# Patient Record
Sex: MALE | Race: WHITE | NOT HISPANIC OR LATINO | Employment: OTHER | URBAN - METROPOLITAN AREA
[De-identification: names, ages, dates, MRNs, and addresses within clinical notes are randomized per-mention and may not be internally consistent; named-entity substitution may affect disease eponyms.]

---

## 2021-01-07 ENCOUNTER — CONSULT (OUTPATIENT)
Dept: PAIN MEDICINE | Facility: CLINIC | Age: 66
End: 2021-01-07
Payer: COMMERCIAL

## 2021-01-07 ENCOUNTER — TELEPHONE (OUTPATIENT)
Dept: PAIN MEDICINE | Facility: MEDICAL CENTER | Age: 66
End: 2021-01-07

## 2021-01-07 VITALS
HEIGHT: 71 IN | BODY MASS INDEX: 26.88 KG/M2 | SYSTOLIC BLOOD PRESSURE: 170 MMHG | DIASTOLIC BLOOD PRESSURE: 98 MMHG | WEIGHT: 192 LBS | TEMPERATURE: 97.2 F | HEART RATE: 71 BPM

## 2021-01-07 DIAGNOSIS — M47.816 FACET HYPERTROPHY OF LUMBAR REGION: ICD-10-CM

## 2021-01-07 DIAGNOSIS — M54.16 LUMBAR RADICULOPATHY: Primary | ICD-10-CM

## 2021-01-07 DIAGNOSIS — M43.16 SPONDYLOLISTHESIS OF LUMBAR REGION: ICD-10-CM

## 2021-01-07 PROCEDURE — 99244 OFF/OP CNSLTJ NEW/EST MOD 40: CPT | Performed by: ANESTHESIOLOGY

## 2021-01-07 RX ORDER — GABAPENTIN 300 MG/1
300 CAPSULE ORAL
Qty: 30 CAPSULE | Refills: 1 | Status: SHIPPED | OUTPATIENT
Start: 2021-01-07

## 2021-01-07 RX ORDER — IBUPROFEN 400 MG/1
TABLET ORAL EVERY 6 HOURS PRN
COMMUNITY

## 2021-01-07 NOTE — PROGRESS NOTES
Assessment  1  Lumbar radiculopathy - Left    2  Spondylolisthesis of lumbar region    3  Facet hypertrophy of lumbar region        Plan      At this point the patient's pain persists despite time, relative rest, activity modification, and nonsteroidal anti-inflammatories  His pain is significantly interfering with his daily living activities  He does have neurological deficit  I believe is appropriate to order an MRI of the lumbar spine to rule out any significant etiology of his symptoms  I will start him on a titrating dose of gabapentin to address any neuropathic component of the patient's pain  I did review the potential side effects of gabapentin, not limited to, but including dizziness, drowsiness, weakness, tired feeling, nausea, diarrhea, constipation, blurred vision, headache, swelling, dry mouth; or loss of balance or coordination     If he has any problems or questions he will give us a call  Once we obtain MRI results we will proceed from there  My impressions and treatment recommendations were discussed in detail with the patient who verbalized understanding and had no further questions  Discharge instructions were provided  I personally saw and examined the patient and I agree with the above discussed plan of care  This note is created using dictation transcription  It may contain typographical errors, grammatical errors, improperly dictated words, background noise and other errors  Orders Placed This Encounter   Procedures    MRI lumbar spine without contrast     Standing Status:   Future     Standing Expiration Date:   1/7/2025     Scheduling Instructions: There is no preparation for this test  Please leave your jewelry and valuables at home, wedding rings are the exception  Magnetic nail polish must be removed prior to arrival for your test  Please bring your insurance cards, a form of photo ID and a list of your medications with you   Arrive 15 minutes prior to your appointment time in order to register  Please bring any prior CT or MRI studies of this area that were not performed at a [facility] facility  To schedule this appointment, please contact Central Scheduling at [phone]  Prior to your appointment, please make sure you complete the MRI Screening Form when you e-Check in for your appointment  This will be available starting 7 days before your appointment in [address]  You may receive an e-mail with an activation code if you do not have a [employer] account  If you do not have access to a device, we will complete your screening at your appointment  Order Specific Question:   What is the patient's sedation requirement? Answer:   No Sedation     Order Specific Question:   Release to patient through [employer]     Answer:   Immediate     Order Specific Question:   Is order priority selected as STAT? Answer:   No     Order Specific Question:   Reason for Exam (FREE TEXT)     Answer:   left lumbar     New Medications Ordered This Visit   Medications    Acetaminophen (TYLENOL 8 HOUR PO)     Sig: Take by mouth    ibuprofen (MOTRIN) 400 mg tablet     Sig: Take by mouth every 6 (six) hours as needed for mild pain    gabapentin (NEURONTIN) 300 mg capsule     Sig: Take 1 capsule (300 mg total) by mouth daily at bedtime     Dispense:  30 capsule     Refill:  1     Referred By: Lindsey Duffy DO  History of Present Illness    Galen Galeazzi is a 63 y o  male with approximately 4-6 weeks history of left lower extremity pain  He is unaware of any clear precipitating event denies any trauma or injury her this may have occurred after carrying heavy box up a flight of stairs  His pain is moderate to severe he has tried oral steroids x2 physician supervised home exercises nonsteroidal anti-inflammatories and opioids, the latter he discontinue consider did not like how it made him feel    His pain is completely interfering with daily living activities which is nearly constant  Describes the pain as shooting sharp achy and has weakness of his left lower limb  He reports that lying down, sitting, relaxation decreases symptoms while standing bending walking and exercise aggravate his pain  Heat nice have provided moderate but short-term relief  Denies bowel or bladder dysfunction  I have personally reviewed and/or updated the patient's past medical history, past surgical history, family history, social history, current medications, allergies, and vital signs today  Review of Systems   Constitutional: Negative for chills and fever  HENT: Negative for ear pain and sore throat  Eyes: Negative for pain and visual disturbance  Respiratory: Negative for cough and shortness of breath  Cardiovascular: Negative for chest pain and palpitations  Gastrointestinal: Negative for abdominal pain and vomiting  Genitourinary: Negative for dysuria and hematuria  Musculoskeletal: Positive for gait problem  Negative for arthralgias and back pain  Skin: Negative for color change and rash  Neurological: Positive for weakness (muscle weakness)  Negative for seizures and syncope  All other systems reviewed and are negative  There is no problem list on file for this patient  Past Medical History:   Diagnosis Date    Hypertension     borderline       History reviewed  No pertinent surgical history      Family History   Problem Relation Age of Onset    Heart disease Mother        Social History     Occupational History    Not on file   Tobacco Use    Smoking status: Never Smoker    Smokeless tobacco: Never Used   Substance and Sexual Activity    Alcohol use: Yes     Frequency: 2-4 times a month    Drug use: Not Currently    Sexual activity: Not Currently       Current Outpatient Medications on File Prior to Visit   Medication Sig    Acetaminophen (TYLENOL 8 HOUR PO) Take by mouth    ibuprofen (MOTRIN) 400 mg tablet Take by mouth every 6 (six) hours as needed for mild pain     No current facility-administered medications on file prior to visit  No Known Allergies    Physical Exam    /98 (BP Location: Left arm, Patient Position: Sitting, Cuff Size: Standard)   Pulse 71   Temp (!) 97 2 °F (36 2 °C)   Ht 5' 11" (1 803 m)   Wt 87 1 kg (192 lb)   BMI 26 78 kg/m²     Constitutional: normal, well developed, well nourished, alert, in no distress and non-toxic and no overt pain behavior  Eyes: anicteric  HEENT: grossly intact  Neck: supple, symmetric, trachea midline and no masses   Pulmonary:even and unlabored  Cardiovascular:No edema or pitting edema present  Skin:Normal without rashes or lesions and well hydrated  Psychiatric:Mood and affect appropriate  Neurologic:Cranial Nerves II-XII grossly intact  Musculoskeletal:normal and antalgic, difficulty going from sitting to standing sitting position no obvious skin lesions or erythema lumbar sacral spine no tenderness to palpation lumbar sacral spine spinous process sacroiliac joint or greater trochanter bilateral, deep tendon reflexes are symmetrical bilateral patellar, absent left Achilles 1+ right Achilles, decreased sensation left L5 distribution to pinwheel, 3-5 strength left extensor hallucis longus no other motor deficits are appreciated, positive straight leg raising left negative on the right    Imaging  Xray Lumbar @ [facility] 12-28-20  Impression:  Degenerative disc and facet disease worse at L4-L5 as above  I have personally reviewed pertinent films in PACS

## 2021-01-07 NOTE — TELEPHONE ENCOUNTER
Pt called stating he has to go to [facility] to have the MRI completed pt needs an authorization before he can schedule   Pt can be reached at [phone]

## 2021-01-07 NOTE — PATIENT INSTRUCTIONS
Lumbar Radiculopathy   WHAT YOU NEED TO KNOW:   What is lumbar radiculopathy? Lumbar radiculopathy is a painful condition that happens when a nerve in your lumbar spine (lower back) is pinched or irritated  Nerves control feeling and movement in your body  What causes lumbar radiculopathy? You may get a pinched nerve in your lumbar spine if you have disc damage  Discs are natural, spongy cushions between your vertebrae (back bones) that allow your spine to move  Your discs may move out of place and pinch the nerve in your spine  Your risk for a pinched nerve and lumbar radiculopathy increases if:  · You smoke  · You have diabetes, a spinal infection, or a growth in your spine  · You are overweight  · You are male  · You are elderly  What are the signs and symptoms of lumbar radiculopathy? You may have any of the following:  · Pain that moves from your lower back to your buttocks, groin, and the back of your leg  The pain is often felt below your knee  Your pain may worsen when you cough, sneeze, stand, or sit  · Numbness, weakness, or tingling in your back or legs  How is lumbar radiculopathy diagnosed? Your healthcare provider will examine you and ask about your family history of back and leg pain  He may also test you for weakness, numbness, or tingling in your back, buttocks, and legs  Your healthcare provider may ask you to lie on your back and lift your leg to locate your pain  You may have any of the following:  · Blood tests: You may need blood taken to give healthcare providers information about how your body is working  The blood may be taken from your hand, arm, or IV  · Magnetic resonance imaging (MRI): An MRI machine is used to take a picture of your lower back  Your healthcare provider will use this picture to check for problems and changes in your back bones, nerves, and discs  You will need to lie still during this test  The MRI machine contains a very powerful magnet  Never enter the MRI room with any metal objects  This can cause serious injury  Tell your healthcare provider if you have any metal implants in your body  · X-ray: An x-ray is a picture of your lower back  A lumbar x-ray may show signs of infection or other problems with your spine  · An electromyography (EMG)  test measures the electrical activity of your muscles at rest and with movement  · Computed tomography (CT) scan: A special x-ray machine uses a computer to take pictures of your lower back  It may be used to look at your bones, discs, and nerves  You may be given dye in your IV to help improve the pictures  Tell your healthcare provider if you are allergic to shellfish, or have other allergies or medical conditions  People who are allergic to iodine or shellfish (lobster, crab, or shrimp) may be allergic to some dyes  How is lumbar radiculopathy treated? Treatment of lumbar radiculopathy may reduce pain and swelling, and improve your ability to walk and do your normal activities  Ask your healthcare provider for more information about these and other treatments for lumbar radiculopathy:  · Medicines:     ? NSAIDs , such as ibuprofen, help decrease swelling, pain, and fever  This medicine is available with or without a doctor's order  NSAIDs can cause stomach bleeding or kidney problems in certain people  If you take blood thinner medicine, always ask your healthcare provider if NSAIDs are safe for you  Always read the medicine label and follow directions  ? Muscle relaxers  help decrease pain and muscle spasms  ? Opioids: This is a strong medicine given to reduce severe pain  It is also called narcotic pain medicine  Take this medicine exactly as directed by your healthcare provider  ? Oral steroids: Steroids may be given to reduce swelling and pain  ? Steroid injections: Healthcare providers may give you steroid medicine through a needle (shot) into your lumbar spine   This may help decrease your nerve pain and swelling  You may need more than 1 injection if your symptoms do not improve after the first treatment  · Physical therapy: Your healthcare provider may suggest physical therapy  Your physical therapist may teach you certain exercises to improve posture (the way you stand and sit), flexibility, and strength in your lower back  He may also teach you how to remain safely active and avoid further injury  Follow the exercise plan given to you by your physical therapist     · Transcutaneous electrical nerve stimulation: This treatment, called TENS, stimulates your nerves and may decrease your pain  Wires are attached to pads  The pads are attached to your skin  The wires send a mild current through your nerves  · Surgery: You may need surgery to relieve your pinched nerve if your condition has not improved within 4 to 6 weeks  You may also need it if you have lumbar radiculopathy more than once  What are the risks of treatment for lumbar radiculopathy? · Without treatment, your pain may worsen  The pinched and swollen nerve may lead to problems when you walk or move  In severe cases, you may lose control of your urine or bowel movements  Bedrest can make your symptoms worse  · Pain medicines can cause vomiting, upset stomach, constipation (large, hard bowel movements that are difficult to pass), or kidney or liver problems  Opioid medicine may be addictive (hard to quit taking once you start)  Oral steroids and steroid injections may have side effects, such as facial redness, fluid retention (water weight gain), and mood changes  Steroid injections may be painful and can cause severe headaches, infections, allergic reactions, or nerve damage  Surgery risks include delayed problems with healing, spinal or bladder infection, damage to the spinal cord or other nerves, and ongoing pain  In rare cases, you could become paralyzed (not able to move your arms or legs)      How can I care for myself when I have lumbar radiculopathy? · Stay active: It is best to be active when you have lumbar radiculopathy  Your healthcare provider may tell you to take walks to ease yourself back into your daily routine  Avoid long periods of bed rest  Bed rest could worsen your symptoms  Do not move in ways that increase your pain  Ask your healthcare provider for more information about the best ways to stay active  · Use ice or heat packs:  Use ice or heat packs on the sore area of your body to decrease the pain and swelling  Put ice in a plastic bag covered with a towel on your low back  Cover heated items with a towel to avoid burns  Use ice and heat as directed  · Avoid heavy lifting: Your condition may worsen if you lift heavy things  Avoid lifting if possible  · Maintain a healthy weight:  Excess body weight may strain your back  Talk with your healthcare provider about ways to lose excess weight if you are overweight  When should I contact my healthcare provider? · Your pain does not improve within 1 to 3 weeks after treatment  · Your pain and weakness keep you from your normal activities at work, home, or school  · You lose more than 10 pounds in 6 months without trying  · You become depressed or sad because of the pain  · You have questions or concerns about your condition or care  When should I seek immediate care or call 911? · You have a fever greater than 100 4°F for longer than 2 days  · You have new, severe back or leg pain, or your pain spreads to both legs  · You have any new signs of numbness or weakness, especially in your lower back, legs, arms, or genital area  · You have new trouble controlling your urine and bowel movements  · You do not feel like your bladder empties when you urinate  CARE AGREEMENT:   You have the right to help plan your care  Learn about your health condition and how it may be treated   Discuss treatment options with your healthcare providers to decide what care you want to receive  You always have the right to refuse treatment  The above information is an  only  It is not intended as medical advice for individual conditions or treatments  Talk to your doctor, nurse or pharmacist before following any medical regimen to see if it is safe and effective for you  © Copyright [address] Information is for End User's use only and may not be sold, redistributed or otherwise used for commercial purposes   All illustrations and images included in CareNotes® are the copyrighted property of A D A M [employer] Inc  or [address] [place] [address]

## 2021-01-11 NOTE — TELEPHONE ENCOUNTER
Order Request Summary   Order ID: [id number]          Request Status:   Authorized  Health Plan:  [facility]     Valid Dates:   01/11/2021 - 03/11/2021     Scheduled Date of Service:   01/11/2021

## 2021-01-12 ENCOUNTER — TELEPHONE (OUTPATIENT)
Dept: PAIN MEDICINE | Facility: CLINIC | Age: 66
End: 2021-01-12

## 2021-01-12 NOTE — TELEPHONE ENCOUNTER
Patient has MRI scheduled at [facility] 1/14 scheduled Virtual with NM on 1/20 at 9:45 am please send email for virtual      Thank you

## 2021-01-14 ENCOUNTER — TELEPHONE (OUTPATIENT)
Dept: PAIN MEDICINE | Facility: CLINIC | Age: 66
End: 2021-01-14

## 2021-01-14 NOTE — TELEPHONE ENCOUNTER
S/w pt, advised of above  Pt stated that he had pain in his low back, L side, L buttocks and down the outside of his L leg and into his L shin  Pt stated that since he started gabapentin, his pain has greatly improved  C/o weakness in his L leg akua in his groin and knee w/ a dull ache  Pt verbalized agreement w/ tfesi  Denied blood thinning medication  Advised pt, will make SL aware  Anticiapte a cb from [address] to schedule this injection  Cb sooner if questions  /concerns arise

## 2021-01-14 NOTE — TELEPHONE ENCOUNTER
Left L4 left L5 TFESI X2, diagnosis lumbar disc herniation, lumbar foraminal stenosis with lumbar radiculitis

## 2021-01-14 NOTE — TELEPHONE ENCOUNTER
----- Message from Cuco Sampson DO sent at 1/14/2021 11:23 AM EST -----  MRI lumbar spine reveals left disc protrusion and foraminal stenosis at L4-5 and L5-S1, how his pain? His pain mostly left-sided?   I would recommend a left L4 left L5 transforaminal epidural steroid injection x2

## 2021-01-22 ENCOUNTER — HOSPITAL ENCOUNTER (OUTPATIENT)
Dept: RADIOLOGY | Facility: CLINIC | Age: 66
Discharge: HOME | End: 2021-01-22
Attending: ANESTHESIOLOGY

## 2021-01-22 ENCOUNTER — TELEPHONE (OUTPATIENT)
Dept: PAIN MEDICINE | Facility: CLINIC | Age: 66
End: 2021-01-22

## 2021-01-22 VITALS
DIASTOLIC BLOOD PRESSURE: 93 MMHG | OXYGEN SATURATION: 99 % | RESPIRATION RATE: 18 BRPM | TEMPERATURE: 97.3 F | HEART RATE: 61 BPM | SYSTOLIC BLOOD PRESSURE: 175 MMHG

## 2021-01-22 DIAGNOSIS — M51.26 LUMBAR DISC HERNIATION: ICD-10-CM

## 2021-01-22 DIAGNOSIS — M54.16 LUMBAR RADICULITIS: ICD-10-CM

## 2021-01-22 DIAGNOSIS — M48.061 LUMBAR FORAMINAL STENOSIS: ICD-10-CM

## 2021-01-22 RX ORDER — LIDOCAINE HYDROCHLORIDE 10 MG/ML
5 INJECTION, SOLUTION EPIDURAL; INFILTRATION; INTRACAUDAL; PERINEURAL ONCE
Status: DISCONTINUED | OUTPATIENT
Start: 2021-01-22 | End: 2021-01-23 | Stop reason: HOSPADM

## 2021-01-22 RX ORDER — PAPAVERINE HCL 150 MG
20 CAPSULE, EXTENDED RELEASE ORAL ONCE
Status: DISCONTINUED | OUTPATIENT
Start: 2021-01-22 | End: 2021-01-23 | Stop reason: HOSPADM

## 2021-01-22 NOTE — DISCHARGE INSTRUCTIONS
Epidural Steroid Injection   WHAT YOU NEED TO KNOW:   An epidural steroid injection (SCOTT) is a procedure to inject steroid medicine into the epidural space  The epidural space is between your spinal cord and vertebrae  Steroids reduce inflammation and fluid buildup in your spine that may be causing pain  You may be given pain medicine along with the steroids  ACTIVITY  · Do not drive or operate machinery today  · No strenuous activity today - bending, lifting, etc   · You may resume normal activites starting tomorrow - start slowly and as tolerated  · You may shower today, but no tub baths or hot tubs  · You may have numbness for several hours from the local anesthetic  Please use caution and common sense, especially with weight-bearing activities  CARE OF THE INJECTION SITE  · If you have soreness or pain, apply ice to the area today (20 minutes on/20 minutes off)  · Starting tomorrow, you may use warm, moist heat or ice if needed  · You may have an increase or change in your discomfort for 36-48 hours after your treatment  · Apply ice and continue with any pain medication you have been prescribed  · Notify [facility] if you have any of the following: redness, drainage, swelling, headache, stiff neck or fever above 100°F     SPECIAL INSTRUCTIONS  · Our office will contact you in approximately 7 days for a progress report  MEDICATIONS  · Continue to take all routine medications  · Our office may have instructed you to hold some medications  If you have a problem specifically related to your procedure, please call our office at [phone]  Problems not related to your procedure should be directed to your primary care physician

## 2021-01-22 NOTE — TELEPHONE ENCOUNTER
S/w Bharath Perry at Dr Sherine Franklin office  Advised of bp 166/99 and 175/93  No bp medications, no pain  Per Bharath Perry, pt has not been seen in the office for bp in 2 years  Please advise pt to contact the office to schedule an appt to be evaluated  Advised pt of above, pt verbalized understandign and appreciation  DC to home       SL aware

## 2021-01-22 NOTE — H&P
History of Present Illness: The patient is a 63 y o  male who presents with complaints of low back and leg pain  Patient Active Problem List   Diagnosis    Spondylolisthesis of lumbar region    Lumbar radiculopathy - Left    Lumbar disc herniation    Lumbar foraminal stenosis    Lumbar radiculitis       Past Medical History:   Diagnosis Date    Hypertension     borderline       No past surgical history on file  Current Outpatient Medications:     Acetaminophen (TYLENOL 8 HOUR PO), Take by mouth, Disp: , Rfl:     gabapentin (NEURONTIN) 300 mg capsule, Take 1 capsule (300 mg total) by mouth daily at bedtime, Disp: 30 capsule, Rfl: 1    ibuprofen (MOTRIN) 400 mg tablet, Take by mouth every 6 (six) hours as needed for mild pain, Disp: , Rfl:     Current Facility-Administered Medications:     dexamethasone (PF) (DECADRON) injection 20 mg, 20 mg, Epidural, Once, Uziel Marvin DO    iohexol (OMNIPAQUE) 300 mg/mL injection 50 mL, 50 mL, Epidural, Once, Uziel Marvin DO    lidocaine (PF) (XYLOCAINE-MPF) 1 % injection 5 mL, 5 mL, Other, Once, Uziel Marvin DO    lidocaine (PF) (XYLOCAINE-MPF) 2 % injection 5 mL, 5 mL, Epidural, Once, Uziel Marvin DO    No Known Allergies    Physical Exam:   General: Awake, Alert, Oriented x 3, Mood and affect appropriate  Respiratory: Respirations even and unlabored  Cardiovascular: Peripheral pulses intact; no edema  Musculoskeletal Exam:  Decreased range of motion lumbar spine    ASA Score: I         Assessment:   1  Lumbar radiculitis    2  Lumbar disc herniation    3   Lumbar foraminal stenosis        Plan: Left L4 left L5 TFESI 1

## 2021-01-25 NOTE — TELEPHONE ENCOUNTER
Pt called in to reschedule his 01/22/2021 procedure  Please be advise thank you        Please call patient back @ [phone]

## 2021-02-04 ENCOUNTER — HOSPITAL ENCOUNTER (OUTPATIENT)
Dept: RADIOLOGY | Facility: CLINIC | Age: 66
Discharge: HOME | End: 2021-02-04
Attending: ANESTHESIOLOGY | Admitting: ANESTHESIOLOGY
Payer: COMMERCIAL

## 2021-02-04 ENCOUNTER — TELEPHONE (OUTPATIENT)
Dept: RADIOLOGY | Facility: CLINIC | Age: 66
End: 2021-02-04

## 2021-02-04 VITALS
TEMPERATURE: 97.7 F | RESPIRATION RATE: 20 BRPM | HEART RATE: 68 BPM | SYSTOLIC BLOOD PRESSURE: 173 MMHG | OXYGEN SATURATION: 97 % | DIASTOLIC BLOOD PRESSURE: 93 MMHG

## 2021-02-04 PROCEDURE — 64484 NJX AA&/STRD TFRM EPI L/S EA: CPT | Performed by: ANESTHESIOLOGY

## 2021-02-04 PROCEDURE — 64483 NJX AA&/STRD TFRM EPI L/S 1: CPT | Performed by: ANESTHESIOLOGY

## 2021-02-04 RX ORDER — CHLORTHALIDONE 25 MG/1
25 TABLET ORAL DAILY
COMMUNITY
Start: 2021-01-22

## 2021-02-04 RX ORDER — PAPAVERINE HCL 150 MG
20 CAPSULE, EXTENDED RELEASE ORAL ONCE
Status: COMPLETED | OUTPATIENT
Start: 2021-02-04 | End: 2021-02-04

## 2021-02-04 RX ORDER — LIDOCAINE HYDROCHLORIDE 10 MG/ML
5 INJECTION, SOLUTION EPIDURAL; INFILTRATION; INTRACAUDAL; PERINEURAL ONCE
Status: COMPLETED | OUTPATIENT
Start: 2021-02-04 | End: 2021-02-04

## 2021-02-04 RX ORDER — RAMIPRIL 5 MG/1
10 CAPSULE ORAL DAILY
COMMUNITY
Start: 2021-02-04

## 2021-02-04 RX ADMIN — IOHEXOL 1 ML: 300 INJECTION, SOLUTION INTRAVENOUS at 11:22

## 2021-02-04 RX ADMIN — LIDOCAINE HYDROCHLORIDE 2 ML: 20 INJECTION, SOLUTION EPIDURAL; INFILTRATION; INTRACAUDAL at 11:21

## 2021-02-04 RX ADMIN — LIDOCAINE HYDROCHLORIDE 3 ML: 10 INJECTION, SOLUTION EPIDURAL; INFILTRATION; INTRACAUDAL; PERINEURAL at 11:18

## 2021-02-04 RX ADMIN — DEXAMETHASONE SODIUM PHOSPHATE 20 MG: 10 INJECTION, SOLUTION INTRAMUSCULAR; INTRAVENOUS at 11:21

## 2021-02-04 NOTE — H&P
History of Present Illness: The patient is a 63 y o  male who presents with complaints of  Low back and leg pain  Patient Active Problem List   Diagnosis    Spondylolisthesis of lumbar region    Lumbar radiculopathy - Left    Lumbar disc herniation    Lumbar foraminal stenosis    Lumbar radiculitis       Past Medical History:   Diagnosis Date    Hypertension     borderline       No past surgical history on file        Current Outpatient Medications:     Acetaminophen (TYLENOL 8 HOUR PO), Take by mouth, Disp: , Rfl:     gabapentin (NEURONTIN) 300 mg capsule, Take 1 capsule (300 mg total) by mouth daily at bedtime, Disp: 30 capsule, Rfl: 1    ibuprofen (MOTRIN) 400 mg tablet, Take by mouth every 6 (six) hours as needed for mild pain, Disp: , Rfl:     Current Facility-Administered Medications:     dexamethasone (PF) (DECADRON) injection 20 mg, 20 mg, Epidural, Once, Uziel Marvin DO    iohexol (OMNIPAQUE) 300 mg/mL injection 50 mL, 50 mL, Epidural, Once, Uziel Marvin DO    lidocaine (PF) (XYLOCAINE-MPF) 1 % injection 5 mL, 5 mL, Other, Once, Uziel Marvin DO    lidocaine (PF) (XYLOCAINE-MPF) 2 % injection 5 mL, 5 mL, Epidural, Once, Uziel Marvin DO    No Known Allergies    Physical Exam:   General: Awake, Alert, Oriented x 3, Mood and affect appropriate  Respiratory: Respirations even and unlabored  Cardiovascular: Peripheral pulses intact; no edema  Musculoskeletal Exam:   Decreased range spine    ASA Score: II         Assessment:  Lumbar foraminal stenosis lumbar radiculitis    Plan: Left L4 left L5 TFESI 1

## 2021-02-04 NOTE — DISCHARGE INSTRUCTIONS
Epidural Steroid Injection   WHAT YOU NEED TO KNOW:   An epidural steroid injection (SCOTT) is a procedure to inject steroid medicine into the epidural space  The epidural space is between your spinal cord and vertebrae  Steroids reduce inflammation and fluid buildup in your spine that may be causing pain  You may be given pain medicine along with the steroids  ACTIVITY  · Do not drive or operate machinery today  · No strenuous activity today - bending, lifting, etc   · You may resume normal activites starting tomorrow - start slowly and as tolerated  · You may shower today, but no tub baths or hot tubs  · You may have numbness for several hours from the local anesthetic  Please use caution and common sense, especially with weight-bearing activities  CARE OF THE INJECTION SITE  · If you have soreness or pain, apply ice to the area today (20 minutes on/20 minutes off)  · Starting tomorrow, you may use warm, moist heat or ice if needed  · You may have an increase or change in your discomfort for 36-48 hours after your treatment  · Apply ice and continue with any pain medication you have been prescribed  · Notify [facility] if you have any of the following: redness, drainage, swelling, headache, stiff neck or fever above 100°F     SPECIAL INSTRUCTIONS  · Our office will contact you in approximately 7 days for a progress report  MEDICATIONS  · Continue to take all routine medications  · Our office may have instructed you to hold some medications  If you have a problem specifically related to your procedure, please call our office at [phone]  Problems not related to your procedure should be directed to your primary care physician

## 2021-02-11 ENCOUNTER — TELEPHONE (OUTPATIENT)
Dept: PAIN MEDICINE | Facility: CLINIC | Age: 66
End: 2021-02-11

## 2021-02-12 NOTE — TELEPHONE ENCOUNTER
Patient said that he is doing fine  Pain level is 0/10, Lt foot feels a little bit of tinglings and a little numb  Its feels strange  The Lt knee goes out every once in a while  he is being careful he is trying to strengthen his leg

## 2021-03-10 ENCOUNTER — TELEPHONE (OUTPATIENT)
Dept: PAIN MEDICINE | Facility: CLINIC | Age: 66
End: 2021-03-10

## 2021-03-10 NOTE — TELEPHONE ENCOUNTER
Srinath Fuentes would like to cancel the following appointments:      [facility] S[facility]P 1 in [facility] [address] ([phone]), 3/12/2021 10:20 AM      Comments:   No longer have pain in back  Muscles in upper leg do knot up at times and are tight all the time  Have been trying to stretch them out  Appointment Cancellation Request    Demetria Dent  Patient Appointment Cancel Request Pool 24 minutes ago (7:50 AM)        Srinath Fuentes would like to cancel the following appointments:     [facility] S[facility]P 1 in [address] [place] [address] And [facility] ([phone]), 3/12/2021 10:20 AM     Comments:  No longer have pain in back  Muscles in upper leg do knot up at times and are tight all the time  Have been trying to stretch them out

## 2025-07-21 ENCOUNTER — HOSPITAL ENCOUNTER (INPATIENT)
Age: 70
LOS: 2 days | Discharge: HOME | DRG: 069 | End: 2025-07-23
Attending: HOSPITALIST | Admitting: INTERNAL MEDICINE
Payer: MEDICARE

## 2025-07-21 DIAGNOSIS — E78.5 DYSLIPIDEMIA: ICD-10-CM

## 2025-07-21 DIAGNOSIS — G45.9 TIA (TRANSIENT ISCHEMIC ATTACK): Primary | ICD-10-CM

## 2025-07-21 PROCEDURE — 85025 COMPLETE CBC W/AUTO DIFF WBC: CPT

## 2025-07-21 PROCEDURE — 9990

## 2025-07-21 PROCEDURE — 70496 CT ANGIOGRAPHY HEAD: CPT

## 2025-07-21 PROCEDURE — 84484 ASSAY OF TROPONIN QUANT: CPT

## 2025-07-21 PROCEDURE — 85730 THROMBOPLASTIN TIME PARTIAL: CPT

## 2025-07-21 PROCEDURE — 80053 COMPREHEN METABOLIC PANEL: CPT

## 2025-07-21 PROCEDURE — 9990 CT HEAD W/O CONTRAST

## 2025-07-21 PROCEDURE — 85610 PROTHROMBIN TIME: CPT

## 2025-07-21 PROCEDURE — 93005 ELECTROCARDIOGRAM TRACING: CPT

## 2025-07-21 PROCEDURE — 70450 CT HEAD/BRAIN W/O DYE: CPT

## 2025-07-21 PROCEDURE — 36415 COLL VENOUS BLD VENIPUNCTURE: CPT

## 2025-07-21 PROCEDURE — 70498 CT ANGIOGRAPHY NECK: CPT

## 2025-07-21 PROCEDURE — 83735 ASSAY OF MAGNESIUM: CPT

## 2025-07-21 PROCEDURE — 99285 EMERGENCY DEPT VISIT HI MDM: CPT

## 2025-07-22 PROBLEM — G45.9 TIA (TRANSIENT ISCHEMIC ATTACK): Status: ACTIVE | Noted: 2025-07-22

## 2025-07-22 PROBLEM — E78.5 DYSLIPIDEMIA: Status: ACTIVE | Noted: 2025-07-22

## 2025-07-22 PROBLEM — I10 PRIMARY HYPERTENSION: Status: ACTIVE | Noted: 2025-07-22

## 2025-07-22 LAB
ALBUMIN SERPL BCG-MCNC: 3.9 G/DL (ref 3.5–5.2)
ALP SERPL-CCNC: 46 U/L (ref 35–160)
ALT SERPL W P-5'-P-CCNC: 31 U/L (ref 5–33)
ANION GAP SERPL CALCULATED.3IONS-SCNC: 11 MMOL/L (ref 7–16)
AST SERPL W P-5'-P-CCNC: 31 U/L (ref 5–40)
BASOPHILS # BLD AUTO: 0.01 THOUSANDS/ÂΜL (ref 0–0.1)
BASOPHILS NFR BLD AUTO: 0 % (ref 0–1)
BILIRUB SERPL-MCNC: 0.34 MG/DL (ref 0.2–1.2)
BUN SERPL-MCNC: 16 MG/DL (ref 8–23)
CALCIUM SERPL-MCNC: 9.3 MG/DL (ref 8.6–10.2)
CHLORIDE SERPL-SCNC: 107 MMOL/L (ref 98–107)
CHOLEST SERPL-MCNC: 224 MG/DL (ref 140–200)
CO2 SERPL-SCNC: 23 MMOL/L (ref 22–29)
CREAT SERPL-MCNC: 0.86 MG/DL (ref 0.7–1.2)
EOSINOPHIL # BLD AUTO: 0.09 THOUSAND/ÂΜL (ref 0–0.61)
EOSINOPHIL NFR BLD AUTO: 2 % (ref 0–6)
ERYTHROCYTE [DISTWIDTH] IN BLOOD BY AUTOMATED COUNT: 13.2 % (ref 11.6–15.1)
EST. AVERAGE GLUCOSE BLD GHB EST-MCNC: 108 MG/DL
GFR SERPL CREATININE-BSD FRML MDRD: 89 ML/MIN/1.73SQ M
GLUCOSE SERPL-MCNC: 117 MG/DL (ref 65–140)
GLUCOSE SERPL-MCNC: 149 MG/DL (ref 65–140)
GLUCOSE SERPL-MCNC: 89 MG/DL (ref 65–140)
HBA1C MFR BLD: 5.4 %
HCT VFR BLD AUTO: 37.7 % (ref 36.5–49.3)
HDLC SERPL-MCNC: 45 MG/DL (ref 40–60)
HGB BLD-MCNC: 12.9 G/DL (ref 12–17)
IMM GRANULOCYTES # BLD AUTO: 0.01 THOUSAND/UL (ref 0–0.2)
IMM GRANULOCYTES NFR BLD AUTO: 0 % (ref 0–2)
LDLC SERPL CALC-MCNC: 158 MG/DL (ref 0–100)
LYMPHOCYTES # BLD AUTO: 1.8 THOUSANDS/ÂΜL (ref 0.6–4.47)
LYMPHOCYTES NFR BLD AUTO: 37 % (ref 14–44)
MCH RBC QN AUTO: 30.4 PG (ref 26.8–34.3)
MCHC RBC AUTO-ENTMCNC: 34.2 G/DL (ref 31.4–37.4)
MCV RBC AUTO: 89 FL (ref 82–98)
MONOCYTES # BLD AUTO: 0.39 THOUSAND/ÂΜL (ref 0.17–1.22)
MONOCYTES NFR BLD AUTO: 8 % (ref 4–12)
NEUTROPHILS # BLD AUTO: 2.58 THOUSANDS/ÂΜL (ref 1.85–7.62)
NEUTS SEG NFR BLD AUTO: 53 % (ref 43–75)
NONHDLC SERPL-MCNC: 179 MG/DL
NRBC BLD AUTO-RTO: 0 /100 WBCS
PLATELET # BLD AUTO: 190 THOUSANDS/UL (ref 149–390)
PMV BLD AUTO: 10.9 FL (ref 8.9–12.7)
POTASSIUM SERPL-SCNC: 3.9 MMOL/L (ref 3.5–5.1)
PROT SERPL-MCNC: 6.1 G/DL (ref 6.4–8.3)
RBC # BLD AUTO: 4.24 MILLION/UL (ref 3.88–5.62)
SODIUM SERPL-SCNC: 141 MMOL/L (ref 136–145)
TRIGL SERPL-MCNC: 107 MG/DL (ref 60–150)
WBC # BLD AUTO: 4.88 THOUSAND/UL (ref 4.31–10.16)

## 2025-07-22 PROCEDURE — 83036 HEMOGLOBIN GLYCOSYLATED A1C: CPT | Performed by: INTERNAL MEDICINE

## 2025-07-22 PROCEDURE — 85025 COMPLETE CBC W/AUTO DIFF WBC: CPT | Performed by: INTERNAL MEDICINE

## 2025-07-22 PROCEDURE — 82948 REAGENT STRIP/BLOOD GLUCOSE: CPT

## 2025-07-22 PROCEDURE — 80061 LIPID PANEL: CPT | Performed by: INTERNAL MEDICINE

## 2025-07-22 PROCEDURE — 80053 COMPREHEN METABOLIC PANEL: CPT | Performed by: INTERNAL MEDICINE

## 2025-07-22 RX ORDER — ENOXAPARIN SODIUM 100 MG/ML
40 INJECTION SUBCUTANEOUS
Status: DISCONTINUED | OUTPATIENT
Start: 2025-07-22 | End: 2025-07-23 | Stop reason: HOSPADM

## 2025-07-22 RX ORDER — ACETAMINOPHEN 325 MG/1
650 TABLET ORAL EVERY 4 HOURS PRN
Status: DISCONTINUED | OUTPATIENT
Start: 2025-07-22 | End: 2025-07-23 | Stop reason: HOSPADM

## 2025-07-22 RX ORDER — MAGNESIUM HYDROXIDE/ALUMINUM HYDROXICE/SIMETHICONE 120; 1200; 1200 MG/30ML; MG/30ML; MG/30ML
15 SUSPENSION ORAL
Status: DISCONTINUED | OUTPATIENT
Start: 2025-07-22 | End: 2025-07-23 | Stop reason: HOSPADM

## 2025-07-22 RX ORDER — ACETAMINOPHEN 650 MG/1
650 SUPPOSITORY RECTAL EVERY 4 HOURS PRN
Status: DISCONTINUED | OUTPATIENT
Start: 2025-07-22 | End: 2025-07-23 | Stop reason: HOSPADM

## 2025-07-22 RX ORDER — ONDANSETRON 2 MG/ML
4 INJECTION INTRAMUSCULAR; INTRAVENOUS EVERY 8 HOURS PRN
Status: DISCONTINUED | OUTPATIENT
Start: 2025-07-22 | End: 2025-07-23 | Stop reason: HOSPADM

## 2025-07-22 RX ADMIN — ENOXAPARIN SODIUM 40 MG: 40 INJECTION SUBCUTANEOUS at 08:36

## 2025-07-22 NOTE — CONSULTS
Consultation - Neurology   Name: Domo Day 69 y.o. male I MRN: [id number]  Unit/Bed#: 3 W 303-01 I Date of Admission: 7/21/2025   Date of Service: 7/22/2025 I Hospital Day: 1   Consults  Physician Requesting Evaluation: Mele Bradley MD   Reason for Evaluation / Principal Problem: TIA/stroke    Assessment & Plan  TIA (transient ischemic attack)  - MRI head WO  - ECHO  - Appreciate Cardiology input  - Continue ASA, Lipitor  - A1C 5.4,  (LDL goal < 70)  - permissive htn  - telemetry monitoring  - permissive htn  - PT/OT eval      Domo Day will need follow up in in 6 weeks with general attending or advance practitioner. He will not require outpatient neurological testing.    History of Present Illness   Domo Day is a 69 y.o. right handed male who presents with a report of R UE tingling that extended from elbow to fingertips, beginning at approx 3:05pm on 7/21/25, and quickly included R ear, R torso, R leg. Pt had felt the arm tingling and endorses a 'bad R elbow' that sometimes has tingling around the elbow. When it began to spread to ear and down leg, he had been standing up and was talking on the phone, so quickly ended the call and sat down. He denied that he had any weakness, but felt that his BP was going up and considered he was anxious due to his sxs.  His wife, @ BS agrees that he did not appear to have any weakness and witnessed him take a few steps to the ambulance gurney in their house, w/o difficulty. She reported pt did not have difficulty speaking & there was no facial droop. Pt denies having had difficulty understanding speech as well.  He denied H/A, dizziness/lightheadedness, change in vision or hearing, difficulty chewing or swallowing.  They also discussed that during intercourse that morning, the pt was unable to ejaculate (while this is not new, & it has happened a couple of times in the past year, were concerned it coincided with the event). He additionally offered that  "earlier that morning when getting up to use the BR he felt dizzy while walking to the BR, but attributed this to getting up to quickly, noting that he doesn't experience that often. Pt said his sxs resolved completely by the time he arrived to the ER & thinks in total, the sxs lasted about 30\". Overnight he did call the nurse to say his R UE felt a little tingly again, and was unsure if it was 'just my bad elbow', but noted he had brief tingling on the anterior surface of his R foot that lasted minutes: again, sxs resolved.    Pt is not on AP or AC, does not take any meds for erectile dysfunction. He treats for BP. He reports his SBP is typically < 160, but intermittently he will check it with concern that he feels it going up/feels pressure in his head, and then it is in the 160s.     He denies PMH of AF, stroke, or cancer.   Lifelong nonsmoker, etoh is social/infrequent, denies substance use. (Had 1 'gummy' 6 wks ago).  His maternal gr-mother had colon cancer; sister had ovarian cancer.     Review of Systems   As above.  Historical Information   Past Medical History[1]  Past Surgical History[2]  Social History[3]  E-Cigarette/Vaping    E-Cigarette Use Never User      E-Cigarette/Vaping Substances    Nicotine No     THC No     CBD No     Flavoring No     Other No     Unknown No      Family History[4]    Objective :  Temp:  [97.5 °F (36.4 °C)-98.2 °F (36.8 °C)] 98.2 °F (36.8 °C)  HR:  [45-69] 69  BP: (131-163)/(78-96) 152/96  Resp:  [16-18] 18  SpO2:  [97 %-100 %] 97 %    Physical Exam  Constitutional:       Appearance: Normal appearance. He is normal weight.   HENT:      Head: Normocephalic.      Mouth/Throat:      Mouth: Mucous membranes are moist.     Eyes:      Extraocular Movements: Extraocular movements intact.      Conjunctiva/sclera: Conjunctivae normal.      Pupils: Pupils are equal, round, and reactive to light.       Neurological:      General: No focal deficit present.      Mental Status: He is alert and " oriented to person, place, and time.      Cranial Nerves: Cranial nerves 2-12 are intact.      Sensory: Sensation is intact.      Motor: Motor function is intact.      Coordination: Coordination is intact.      Gait: Gait is intact.     Neurological Exam  Mental Status  Alert. Oriented to person, place, and time.    Cranial Nerves  CN III, IV, VI: Extraocular movements intact bilaterally. Pupils equal round and reactive to light bilaterally.    Sensory  Normal sensation.    Coordination    Finger-to-nose, rapid alternating movements and heel-to-shin normal bilaterally without dysmetria.    Gait   Normal gait.          Lab Results: I have reviewed the following results:HgBA1C:   Results from last 7 days   Lab Units 07/22/25  0626   HEMOGLOBIN A1C % 5.4   , Lipid Profile:   Results from last 7 days   Lab Units 07/22/25  0626   HDL mg/dL 45   LDL CALC mg/dL 158*   TRIGLYCERIDES mg/dL 107     Recent Labs     07/22/25  0626   WBC 4.88   HGB 12.9   HCT 37.7      SODIUM 141   K 3.9      CO2 23   BUN 16   CREATININE 0.86   GLUC 89           VTE Prophylaxis: Enoxaparin (Lovenox)           [1]   Past Medical History:  Diagnosis Date    Hypertension     borderline   [2] No past surgical history on file.  [3]   Social History  Tobacco Use    Smoking status: Never    Smokeless tobacco: Never   Vaping Use    Vaping status: Never Used   Substance and Sexual Activity    Alcohol use: Yes    Drug use: Not Currently    Sexual activity: Not Currently   [4]   Family History  Problem Relation Name Age of Onset    Heart disease Mother

## 2025-07-22 NOTE — PROGRESS NOTES
Progress Note - Hospitalist   Name: Domo Day 69 y.o. male I MRN: [id number]  Unit/Bed#: 3 W 303-01 I Date of Admission: 7/21/2025   Date of Service: 7/22/2025 I Hospital Day: 1    Assessment & Plan  TIA (transient ischemic attack)  Initiated stroke workup  CT head and CTA head and neck done in the ER  Pending MRI brain to rule out acute stroke  Patient has a history of hypertension and takes blood pressure medications at home till the stroke is ruled out we will hold blood pressure medications and allow permissive hypertension  Continue DVT prophylaxis      Primary hypertension  Will hold home antihypertensive medications till stroke is ruled out  Dyslipidemia  Continue aspirin and statin    VTE Pharmacologic Prophylaxis:   Moderate Risk (Score 3-4) - Pharmacological DVT Prophylaxis Ordered: enoxaparin (Lovenox).    Mobility:   JH-HLM Achieved: 8: Walk 250 feet ot more  JH-HLM Goal achieved. Continue to encourage appropriate mobility.    Patient Centered Rounds: I performed bedside rounds with nursing staff today.   Discussions with Specialists or Other Care Team Provider: Neurology    Education and Discussions with Family / Patient: Updated  (wife) at bedside.    Current Length of Stay: 1 day(s)  Current Patient Status: Inpatient   Certification Statement: The patient will continue to require additional inpatient hospital stay due to stroke work up  Discharge Plan: Anticipate discharge in 24-48 hrs to home.    Code Status: Level 1 - Full Code    Subjective   Seen and examined bedside denies any new complaints.    Objective :  Temp:  [97.5 °F (36.4 °C)-98.2 °F (36.8 °C)] 98.2 °F (36.8 °C)  HR:  [45-69] 69  BP: (131-163)/(78-96) 152/96  Resp:  [16-18] 18  SpO2:  [97 %-100 %] 97 %    Body mass index is 27.3 kg/m².     Input and Output Summary (last 24 hours):     Intake/Output Summary (Last 24 hours) at 7/22/2025 7389  Last data filed at 7/22/2025 0649  Gross per 24 hour   Intake 120 ml   Output --    Net 120 ml       Physical Exam  Constitutional:       Appearance: Normal appearance.   HENT:      Head: Normocephalic and atraumatic.     Eyes:      Extraocular Movements: Extraocular movements intact.      Pupils: Pupils are equal, round, and reactive to light.       Cardiovascular:      Rate and Rhythm: Normal rate.   Pulmonary:      Effort: Pulmonary effort is normal.   Abdominal:      Palpations: Abdomen is soft.     Skin:     General: Skin is warm.     Neurological:      General: No focal deficit present.      Mental Status: He is alert and oriented to person, place, and time.      Cranial Nerves: No cranial nerve deficit.      Sensory: No sensory deficit.     Psychiatric:         Mood and Affect: Mood normal.           Lines/Drains:        Telemetry:  Telemetry Orders (From admission, onward)               24 Hour Telemetry Monitoring  Continuous x 24 Hours (Telem)        Expiring   Question:  Reason for 24 Hour Telemetry  Answer:  Decompensated CHF- and any one of the following: continuous diuretic infusion or total diuretic dose >200 mg daily, associated electrolyte derangement (I.e. K < 3.0), inotropic drip (continuous infusion), hx of ventricular arrhythmia, or new EF < 35%                     Telemetry Reviewed: Normal Sinus Rhythm  Indication for Continued Telemetry Use: Acute CVA               Lab Results: I have reviewed the following results:   Results from last 7 days   Lab Units 07/22/25  0626   WBC Thousand/uL 4.88   HEMOGLOBIN g/dL 12.9   HEMATOCRIT % 37.7   PLATELETS Thousands/uL 190   SEGS PCT % 53   LYMPHO PCT % 37   MONO PCT % 8   EOS PCT % 2     Results from last 7 days   Lab Units 07/22/25  0626   SODIUM mmol/L 141   POTASSIUM mmol/L 3.9   CHLORIDE mmol/L 107   CO2 mmol/L 23   BUN mg/dL 16   CREATININE mg/dL 0.86   ANION GAP mmol/L 11   CALCIUM mg/dL 9.3   ALBUMIN g/dL 3.9   TOTAL BILIRUBIN mg/dL 0.34   ALK PHOS U/L 46   ALT U/L 31   AST U/L 31   GLUCOSE RANDOM mg/dL 89         Results  from last 7 days   Lab Units 07/22/25  1108 07/22/25  0834   POC GLUCOSE mg/dl 117 149*     Results from last 7 days   Lab Units 07/22/25  0626   HEMOGLOBIN A1C % 5.4           Recent Cultures (last 7 days):               Last 24 Hours Medication List:     Current Facility-Administered Medications:     acetaminophen (TYLENOL) tablet 650 mg, Q4H PRN **OR** acetaminophen (TYLENOL) rectal suppository 650 mg, Q4H PRN    aluminum-magnesium hydroxide-simethicone (MAALOX) oral suspension 15 mL, Q1H PRN    enoxaparin (LOVENOX) subcutaneous injection 40 mg, Q24H CHAMP    ondansetron (ZOFRAN) injection 4 mg, Q8H PRN    Administrative Statements   Today, Patient Was Seen By: Mele Bradley MD      **Please Note: This note may have been constructed using a voice recognition system.**

## 2025-07-22 NOTE — ASSESSMENT & PLAN NOTE
Initiated stroke workup  CT head and CTA head and neck done in the ER  Pending MRI brain to rule out acute stroke  Patient has a history of hypertension and takes blood pressure medications at home till the stroke is ruled out we will hold blood pressure medications and allow permissive hypertension  Continue DVT prophylaxis

## 2025-07-22 NOTE — ASSESSMENT & PLAN NOTE
- MRI head WO  - ECHO  - Appreciate Cardiology input  - Continue ASA, Lipitor  - A1C 5.4,  (LDL goal < 70)  - permissive htn  - telemetry monitoring  - permissive htn  - PT/OT eval

## 2025-07-22 NOTE — CASE MANAGEMENT
Case Management Assessment    Patient name Domo Day  Location 3 W 303/3 W 303-01 MRN [id number]  : 1955 Date 2025       Current Admission Date: 2025  Current Admission Diagnosis:TIA (transient ischemic attack)   Patient Active Problem List    Diagnosis Date Noted    TIA (transient ischemic attack) 2025    Lumbar disc herniation     Lumbar foraminal stenosis     Lumbar radiculitis     Spondylolisthesis of lumbar region 2021    Lumbar radiculopathy - Left 2021      LOS (days): 1  Geometric Mean LOS (GMLOS) (days):   Days to GMLOS:     OBJECTIVE:    Risk of Unplanned Readmission Score: 4.64         Current admission status: Inpatient       Preferred Pharmacy: No Pharmacies Listed  Primary Care Provider: Celso Caruso DO    Primary Insurance: MEDICARE  Secondary Insurance: AARP    ASSESSMENT:  Active Health Care Proxies    There are no active Health Care Proxies on file.                   Patient Information  Admitted from:: Home  Mental Status: Alert  During Assessment patient was accompanied by: Not accompanied during assessment  Assessment information provided by:: Patient  Primary Caregiver: Self  Support Systems: Self, Spouse/significant other  County of Residence: [place]  What [facility] do you live in?: [place]  Home entry access options. Select all that apply.: Stairs  Number of steps to enter home.: 2  Do the steps have railings?: No  Type of Current Residence: 2 story home  Upon entering residence, is there a bedroom on the main floor (no further steps)?: No  A bedroom is located on the following floor levels of residence (select all that apply):: 2nd Floor  Upon entering residence, is there a bathroom on the main floor (no further steps)?: Yes  Number of steps to 2nd floor from main floor: One Flight  Living Arrangements: Lives w/ Spouse/significant other    Activities of Daily Living Prior to Admission  Functional Status: Independent  Completes ADLs  independently?: Yes  Ambulates independently?: Yes  Does patient use assisted devices?: No  Does patient currently own DME?: No  Does patient have a history of Outpatient Therapy (PT/OT)?: No  Does the patient have a history of Short-Term Rehab?: No  Does patient have a history of HHC?: No  Does patient currently have HHC?: No         Patient Information Continued  Income Source: Self-employed  Does patient have prescription coverage?: Yes  Can the patient afford their medications and any related supplies (such as glucometers or test strips)?: Yes  Does patient receive dialysis treatments?: No         Means of Transportation  Means of Transport to Appts:: Drives Self

## 2025-07-23 ENCOUNTER — APPOINTMENT (INPATIENT)
Age: 70
DRG: 069 | End: 2025-07-23
Payer: MEDICARE

## 2025-07-23 VITALS
DIASTOLIC BLOOD PRESSURE: 80 MMHG | BODY MASS INDEX: 27.26 KG/M2 | RESPIRATION RATE: 18 BRPM | HEIGHT: 72 IN | SYSTOLIC BLOOD PRESSURE: 145 MMHG | WEIGHT: 201.28 LBS | HEART RATE: 57 BPM | TEMPERATURE: 98.3 F | OXYGEN SATURATION: 98 %

## 2025-07-23 PROCEDURE — 70551 MRI BRAIN STEM W/O DYE: CPT

## 2025-07-23 RX ORDER — ASPIRIN 81 MG/1
81 TABLET, CHEWABLE ORAL DAILY
Qty: 30 TABLET | Refills: 1 | Status: SHIPPED | OUTPATIENT
Start: 2025-07-23

## 2025-07-23 RX ORDER — ATORVASTATIN CALCIUM 20 MG/1
20 TABLET, FILM COATED ORAL DAILY
Qty: 30 TABLET | Refills: 1 | Status: SHIPPED | OUTPATIENT
Start: 2025-07-23

## 2025-07-23 RX ADMIN — ENOXAPARIN SODIUM 40 MG: 40 INJECTION SUBCUTANEOUS at 10:48

## 2025-07-23 NOTE — ASSESSMENT & PLAN NOTE
Initiated stroke workup  CT head and CTA head and neck done in the ER  Pending MRI brain to rule out acute stroke  Patient has a history of hypertension and takes blood pressure medications at home till the stroke is ruled out we will hold blood pressure medications and allow permissive hypertension  Continue DVT prophylaxis  7/23: MRI negative for acute stroke  Continue aspirin and statin and follow-up with cardiology for outpatient echo  Also follow-up with neurology as an outpatient  Restart home antihypertensives.

## 2025-07-23 NOTE — PLAN OF CARE
Problem: SAFETY ADULT  Goal: Patient will remain free of falls  Description: INTERVENTIONS:  - Educate patient/family on patient safety including physical limitations  - Instruct patient to call for assistance with activity   - Consider consulting OT/PT to assist with strengthening/mobility based on AM PAC & JH-HLM score  - Consult OT/PT to assist with strengthening/mobility   - Keep Call bell within reach  - Keep bed low and locked with side rails adjusted as appropriate  - Keep care items and personal belongings within reach  - Initiate and maintain comfort rounds  - Make Fall Risk Sign visible to staff    - Apply yellow socks and bracelet for high fall risk patients  - Consider moving patient to room near nurses station  Outcome: Progressing

## 2025-07-23 NOTE — ASSESSMENT & PLAN NOTE
- MRI head (No acute findings)  -  Outpt Cardiology follow up (pt does not see cards: agreeable to having [facility] notified to have set up for new pt appt)  - ECHO (to do outpt)  - Continue ASA, Lipitor  - A1C 5.4,  (LDL goal < 70)  - BG mgmnt - cont Ramapril from home  - Neuro follow up (will send info to hospitals neurology for outpt appt)  - No neuro barriers for d/c

## 2025-07-23 NOTE — DISCHARGE SUMMARY
Discharge Summary - Hospitalist   Name: Domo Day 69 y.o. male I MRN: [id number]  Unit/Bed#: 3 W 303-01 I Date of Admission: 7/21/2025   Date of Service: 7/23/2025 I Hospital Day: 2     Assessment & Plan  TIA (transient ischemic attack)  Initiated stroke workup  CT head and CTA head and neck done in the ER  Pending MRI brain to rule out acute stroke  Patient has a history of hypertension and takes blood pressure medications at home till the stroke is ruled out we will hold blood pressure medications and allow permissive hypertension  Continue DVT prophylaxis  7/23: MRI negative for acute stroke  Continue aspirin and statin and follow-up with cardiology for outpatient echo  Also follow-up with neurology as an outpatient  Restart home antihypertensives.      Primary hypertension  Will hold home antihypertensive medications till stroke is ruled out  Dyslipidemia  Continue aspirin and statin     Medical Problems       Resolved Problems  Date Reviewed: 1/7/2021   None       Discharging Physician / Practitioner: Mele Bradley MD  PCP: Celso Caruso DO  Admission Date:   Admission Orders (From admission, onward)       Ordered        07/22/25 0207  INPATIENT ADMISSION  Once            07/22/25 0148  INPATIENT ADMISSION  Once            07/22/25 0144  INPATIENT ADMISSION  Once                          Discharge Date: 07/23/25    Next Steps for Physician/AP Assuming Care:  Outpatient echocardiogram cardiology neurology follow-up with PCP        Medication Changes for Discharge & Rationale:   Discharge medications as per discharge instructions    Consultations During Hospital Stay:  Neurology      Significant Findings / Test Results:   TIA/ hypercholesterolemia      Hospital Course:   Domo Day is a 69 y.o. male patient who originally presented to the hospital on 7/21/2025 due to TIA-like symptoms and had a stroke workup.  MRI negative for acute stroke  This time is stable for discharge and follow-up with  neurology, cardiology and PCP as an outpatient.          Please see above list of diagnoses and related plan for additional information.     Discharge Day Visit / Exam:   Subjective: Patient seen and examined bedside, denies any new complaints  Vitals: Blood Pressure: 145/80 (07/23/25 1523)  Pulse: 57 (07/23/25 1523)  Temperature: 98.3 °F (36.8 °C) (07/23/25 1523)  Temp Source: Oral (07/23/25 1523)  Respirations: 18 (07/23/25 1523)  Height: 6' (182.9 cm) (07/22/25 0100)  Weight - Scale: 91.3 kg (201 lb 4.5 oz) (07/22/25 0000)  SpO2: 98 % (07/23/25 1523)  Physical Exam  Normocephalic/atraumatic  Moist mucous membranes  No JVD  S1-S2 normal clear lungs  Soft nontender positive bowel sounds  Alert and oriented x 3 no motor deficits   No Peripheral edema    Discussion with Family: Updated  (wife) at bedside.    Discharge instructions/Information to patient and family:   See after visit summary for information provided to patient and family.      Provisions for Follow-Up Care:  See after visit summary for information related to follow-up care and any pertinent home health orders.      Mobility at time of Discharge:   Basic Mobility Inpatient Raw Score: 24  JH-HLM Goal: 8: Walk 250 feet or more  JH-HLM Achieved: 8: Walk 250 feet ot more  HLM Goal achieved. Continue to encourage appropriate mobility.     Disposition:   Home      Administrative Statements   Discharge Statement:  I have spent a total time of >30 minutes in caring for this patient on the day of the visit/encounter. .    **Please Note: This note may have been constructed using a voice recognition system**

## 2025-07-23 NOTE — PLAN OF CARE
Problem: PAIN - ADULT  Goal: Verbalizes/displays adequate comfort level or baseline comfort level  Description: Interventions:  - Encourage patient to monitor pain and request assistance  - Assess pain using appropriate pain scale  - Administer analgesics as ordered based on type and severity of pain and evaluate response  - Implement non-pharmacological measures as appropriate and evaluate response  - Consider cultural and social influences on pain and pain management  - Notify physician/advanced practitioner if interventions unsuccessful or patient reports new pain  - Educate patient/family on pain management process including their role and importance of  reporting pain   - Provide non-pharmacologic/complimentary pain relief interventions  7/23/2025 0707 by Blanca Staton RN  Outcome: Progressing  7/22/2025 2117 by Blanca Staton RN  Outcome: Progressing     Problem: INFECTION - ADULT  Goal: Absence or prevention of progression during hospitalization  Description: INTERVENTIONS:  - Assess and monitor for signs and symptoms of infection  - Monitor lab/diagnostic results  - Monitor all insertion sites, i.e. indwelling lines, tubes, and drains  - Monitor endotracheal if appropriate and nasal secretions for changes in amount and color  - [place] appropriate cooling/warming therapies per order  - Administer medications as ordered  - Instruct and encourage patient and family to use good hand hygiene technique  - Identify and instruct in appropriate isolation precautions for identified infection/condition  7/23/2025 0707 by Blanca Staton RN  Outcome: Progressing  7/22/2025 2117 by Blanca Staton RN  Outcome: Progressing  Goal: Absence of fever/infection during neutropenic period  Description: INTERVENTIONS:  - Monitor WBC  - Perform strict hand hygiene  - Limit to healthy visitors only  - No plants, dried, fresh or silk flowers with gonzales in patient room  7/23/2025 0707 by Blanca Staton RN  Outcome:  Progressing  7/22/2025 2117 by Blanca Staton RN  Outcome: Progressing     Problem: SAFETY ADULT  Goal: Patient will remain free of falls  Description: INTERVENTIONS:  - Educate patient/family on patient safety including physical limitations  - Instruct patient to call for assistance with activity   - Consider consulting OT/PT to assist with strengthening/mobility based on AM PAC & JH-HLM score  - Consult OT/PT to assist with strengthening/mobility   - Keep Call bell within reach  - Keep bed low and locked with side rails adjusted as appropriate  - Keep care items and personal belongings within reach  - Initiate and maintain comfort rounds  - Make Fall Risk Sign visible to staff  - Offer Toileting every *** Hours, in advance of need  - Initiate/Maintain ***alarm  - Obtain necessary fall risk management equipment: ***  - Apply yellow socks and bracelet for high fall risk patients  - Consider moving patient to room near nurses station  7/23/2025 0707 by Blanca Staton RN  Outcome: Progressing  7/22/2025 2117 by Blanca Staton RN  Outcome: Progressing  Goal: Maintain or return to baseline ADL function  Description: INTERVENTIONS:  -  Assess patient's ability to carry out ADLs; assess patient's baseline for ADL function and identify physical deficits which impact ability to perform ADLs (bathing, care of mouth/teeth, toileting, grooming, dressing, etc.)  - Assess/evaluate cause of self-care deficits   - Assess range of motion  - Assess patient's mobility; develop plan if impaired  - Assess patient's need for assistive devices and provide as appropriate  - Encourage maximum independence but intervene and supervise when necessary  - Involve family in performance of ADLs  - Assess for home care needs following discharge   - Consider OT consult to assist with ADL evaluation and planning for discharge  - Provide patient education as appropriate  - Monitor functional capacity and physical performance, use of AM PAC & JH-HLM    - Monitor gait, balance and fatigue with ambulation    7/23/2025 0707 by Blanac Staton RN  Outcome: Progressing  7/22/2025 2117 by Blanca Staton RN  Outcome: Progressing  Goal: Maintains/Returns to pre admission functional level  Description: INTERVENTIONS:  - Perform AM-PAC 6 Click Basic Mobility/ Daily Activity assessment daily.  - Set and communicate daily mobility goal to care team and patient/family/caregiver.   - Collaborate with rehabilitation services on mobility goals if consulted  - Perform Range of Motion 2 times a day.  - Reposition patient every 2 hours.  - Dangle patient *** times a day  - Stand patient *** times a day  - Ambulate patient *** times a day  - Out of bed to chair *** times a day   - Out of bed for meals *** times a day  - Out of bed for toileting  - Record patient progress and toleration of activity level   7/23/2025 0707 by Blanca Staton RN  Outcome: Progressing  7/22/2025 2117 by Blanca Staton RN  Outcome: Progressing     Problem: DISCHARGE PLANNING  Goal: Discharge to home or other facility with appropriate resources  Description: INTERVENTIONS:  - Identify barriers to discharge w/patient and caregiver  - Arrange for needed discharge resources and transportation as appropriate  - Identify discharge learning needs (meds, wound care, etc.)  - Arrange for interpretive services to assist at discharge as needed  - Refer to Case Management Department for coordinating discharge planning if the patient needs post-hospital services based on physician/advanced practitioner order or complex needs related to functional status, cognitive ability, or social support system  7/23/2025 0707 by Blanca Staton RN  Outcome: Progressing  7/22/2025 2117 by Blanca Staton RN  Outcome: Progressing     Problem: Knowledge Deficit  Goal: Patient/family/caregiver demonstrates understanding of disease process, treatment plan, medications, and discharge instructions  Description: Complete learning assessment  and assess knowledge base.  Interventions:  - Provide teaching at level of understanding  - Provide teaching via preferred learning methods  7/23/2025 0707 by Blanca Staton RN  Outcome: Progressing  7/22/2025 2117 by Blanca Staton RN  Outcome: Progressing

## 2025-07-23 NOTE — PROGRESS NOTES
"Progress Note - Neurology   Name: Domo Day 69 y.o. male I MRN: [id number]  Unit/Bed#: 3 W 303-01 I Date of Admission: 7/21/2025   Date of Service: 7/23/2025 I Hospital Day: 2    Assessment & Plan  TIA (transient ischemic attack)  - MRI head (No acute findings)  -  Outpt Cardiology follow up (pt does not see cards: agreeable to having [facility]\"" notified to have set up for new pt appt)  - ECHO (to do outpt)  - Continue ASA, Lipitor  - A1C 5.4,  (LDL goal < 70)  - BG mgmnt - cont Ramapril from home  - Neuro follow up (will send info to \[facility]\"" neurology for outpt appt)  - No neuro barriers for d/c  Primary hypertension    Dyslipidemia      Domo Day will need follow up in in 6 weeks with general attending or advance practitioner. He will not require outpatient neurological testing.      Subjective   Pt feels well. He reports getting intermittent tingling of R arm (unsure if it is due to his bad elbow), and numbness on dorsal surface of R foot (\"maybe b/c I'm sitting around on this bed - not used to sitting like this\"). Otherwise, no new sxs, feels back to baseline.     Review of Systems  As above    Objective :  Temp:  [97.4 °F (36.3 °C)-98.3 °F (36.8 °C)] 98.3 °F (36.8 °C)  HR:  [49-58] 57  BP: (140-157)/(60-90) 145/80  Resp:  [18] 18  SpO2:  [98 %-99 %] 98 %  O2 Device: None (Room air)    Physical Exam  Vitals and nursing note reviewed.   Constitutional:       Appearance: Normal appearance. He is normal weight.   HENT:      Head: Normocephalic.     Eyes:      General: Lids are normal.      Extraocular Movements: Extraocular movements intact.      Conjunctiva/sclera: Conjunctivae normal.       Musculoskeletal:      Cervical back: Normal range of motion.     Neurological:      General: No focal deficit present.      Mental Status: He is oriented to person, place, and time.     Psychiatric:         Mood and Affect: Mood normal.         Speech: Speech normal.         Behavior: Behavior normal.        "  Thought Content: Thought content normal.         Judgment: Judgment normal.     Neurological Exam  Mental Status  Awake, alert and oriented to person, place and time. Oriented to person, place, and time. Speech is normal. Language is fluent with no aphasia. Attention and concentration are normal.    Cranial Nerves  CN II: Visual fields full to confrontation.  CN III, IV, VI: Extraocular movements intact bilaterally. Normal lids and orbits bilaterally.  CN V: Facial sensation is normal.  CN VII: Full and symmetric facial movement.  CN VIII: Hearing is normal.  CN XI: Shoulder shrug strength is normal.  CN XII: Tongue midline without atrophy or fasciculations.        Lab Results: I have reviewed the following results:        VTE Pharmacologic Prophylaxis: Enoxaparin (Lovenox)

## 2025-07-23 NOTE — CASE MANAGEMENT
Case Management Discharge Planning Note    Patient name Domo Day  Location 3 W 303/3 W 303-01 MRN [id number]  : 1955 Date 2025       Current Admission Date: 2025  Current Admission Diagnosis:TIA (transient ischemic attack)   Patient Active Problem List    Diagnosis Date Noted    TIA (transient ischemic attack) 2025    Primary hypertension 2025    Dyslipidemia 2025    Lumbar disc herniation     Lumbar foraminal stenosis     Lumbar radiculitis     Spondylolisthesis of lumbar region 2021    Lumbar radiculopathy - Left 2021      LOS (days): 2  Geometric Mean LOS (GMLOS) (days): 2  Days to GMLOS:0.4     OBJECTIVE:  Risk of Unplanned Readmission Score: 5.52         Current admission status: Inpatient   Preferred Pharmacy: No Pharmacies Listed  Primary Care Provider: Celso Caruso DO    Primary Insurance: MEDICARE  Secondary Insurance: [facility]    DISCHARGE DETAILS    Additional Discharge Dispositions: Home or Self Care  Transport at Discharge : Self, Family      Pt's chart reviewed. Pt discussed during morning rounds; CORTEZ reported ADC today vs tomorrow pending MRI. CM continue to follow.

## 2025-07-24 ENCOUNTER — HOSPITAL ENCOUNTER (EMERGENCY)
Age: 70
Discharge: HOME | End: 2025-07-25
Payer: MEDICARE

## 2025-07-24 VITALS
TEMPERATURE: 98 F | BODY MASS INDEX: 26.93 KG/M2 | HEART RATE: 56 BPM | OXYGEN SATURATION: 96 % | WEIGHT: 198.85 LBS | SYSTOLIC BLOOD PRESSURE: 159 MMHG | HEIGHT: 72 IN | DIASTOLIC BLOOD PRESSURE: 93 MMHG | RESPIRATION RATE: 16 BRPM

## 2025-07-24 DIAGNOSIS — I10 PRIMARY HYPERTENSION: Primary | ICD-10-CM

## 2025-07-24 PROCEDURE — 99283 EMERGENCY DEPT VISIT LOW MDM: CPT

## 2025-07-24 RX ORDER — NIFEDIPINE 30 MG
30 TABLET, EXTENDED RELEASE ORAL DAILY
Status: DISCONTINUED | OUTPATIENT
Start: 2025-07-25 | End: 2025-07-24

## 2025-07-24 RX ORDER — NIFEDIPINE 30 MG
30 TABLET, EXTENDED RELEASE ORAL ONCE
Status: COMPLETED | OUTPATIENT
Start: 2025-07-24 | End: 2025-07-24

## 2025-07-24 RX ADMIN — NIFEDIPINE 30 MG: 30 TABLET, FILM COATED, EXTENDED RELEASE ORAL at 22:46

## 2025-07-24 NOTE — Clinical Note
Domo Day was seen and treated in our emergency department on 7/24/2025.    No restrictions            Diagnosis:     Domo  may return to work on return date.    He may return on this date: 07/25/2025         If you have any questions or concerns, please don't hesitate to call.      Kiana Turcios MD    ______________________________           _______________          _______________  Hospital Representative                              Date                                Time

## 2025-07-25 LAB
ATRIAL RATE: 60 BPM
P AXIS: 25 DEGREES
PR INTERVAL: 196 MS
QRS AXIS: -38 DEGREES
QRSD INTERVAL: 94 MS
QT INTERVAL: 422 MS
QTC INTERVAL: 422 MS
T WAVE AXIS: 6 DEGREES
VENTRICULAR RATE: 60 BPM

## 2025-07-25 PROCEDURE — 93010 ELECTROCARDIOGRAM REPORT: CPT | Performed by: INTERNAL MEDICINE

## 2025-07-25 NOTE — DISCHARGE INSTRUCTIONS
If you have new or worsening symptoms, please return to the emergency room for evaluation.     Please call your primary doctor during their normal office hours to discuss your visit to the Emergency Department and schedule follow-up appointments and referrals as needed.

## 2025-07-25 NOTE — ED PROVIDER NOTES
Time reflects when diagnosis was documented in both MDM as applicable and the Disposition within this note       Time User Action Codes Description Comment    7/24/2025 11:35 PM Kiana Turcios Add [I10] Primary hypertension           ED Disposition       ED Disposition   Discharge    Condition   Stable    Date/Time   u Jul 24, 2025 11:35 PM    Comment   Domo Day discharge to home/self care.                   Assessment & Plan       Medical Decision Making  Patient is a 69-year-old male with a past medical history of TIA, primary hypertension presenting with high blood pressure    Differential considered includes but is not limited to inadequately treated essential hypertension, not consistent with hypertensive emergency    On arrival patient systolic pressure 209, improved to 180/104 without intervention, last measured 157/93.  Through shared decision making elected to begin patient's nifedipine this evening as opposed to tomorrow morning.  Will give 30 mg in ER.  No red flag symptoms for hypertensive emergency including no chest pain, shortness of breath, headaches, visual changes, abdominal pain.  Given patient has follow-up tomorrow with his primary care doctor will recommend he undergo routine lab evaluation as they deem necessary and have repeat pressure taken at that time.  No indication for admission.  Stable for discharge.    Risk  Prescription drug management.             Medications   NIFEdipine ER (ADALAT CC) 24 hr tablet 30 mg (30 mg Oral Given 7/24/25 2246)       ED Risk Strat Scores                    No data recorded        SBIRT 20yo+      Flowsheet Row Most Recent Value   Initial Alcohol Screen: US AUDIT-C     1. How often do you have a drink containing alcohol? 2 Filed at: 07/24/2025 2118   2. How many drinks containing alcohol do you have on a typical day you are drinking?  3 Filed at: 07/24/2025 2118   3a. Male UNDER 65: How often do you have five or more drinks on one occasion? 0 Filed at:  07/24/2025 2118   Audit-C Score 5 Filed at: 07/24/2025 2118   ROMAIN: How many times in the past year have you...    Used an illegal drug or used a prescription medication for non-medical reasons? Never Filed at: 07/24/2025 2118                            History of Present Illness       Chief Complaint   Patient presents with    Hypertension     Pt  d/c s/p TIA. Pt reported 'head pressure' and took at home BP which was elevated.        Past Medical History[1]   Past Surgical History[2]   Family History[3]   Social History[4]   E-Cigarette/Vaping    E-Cigarette Use Never User       E-Cigarette/Vaping Substances    Nicotine No     THC No     CBD No     Flavoring No     Other No     Unknown No       I have reviewed and agree with the history as documented.     Patient is a 69-year-old male with a past medical history of TIA, primary hypertension presenting with high blood pressure    1 patient reports he can often feel when his blood pressure is high.  Today he felt some pressure in his temples and checked his blood pressure and it was greater than 200 systolic.  Was brought in by ambulance because wife was concerned.  Denies chest pain, shortness of breath, vision changes, seeing spots, headache, abdominal pain or any other sick symptoms.  Reports he has been having his hypertension medications adjusted.  Was changed from 10 mg of ramipril daily to 5 mg twice daily.  Last took his ramipril approximately 6 PM.  Is post to start nifedipine 30 mg daily tomorrow morning and has an appointment with his primary care doctor in the evening to reassess.      Hypertension  Associated symptoms: no chest pain, no dizziness, no headaches and no shortness of breath        Review of Systems   Eyes:  Negative for visual disturbance.   Respiratory:  Negative for shortness of breath.    Cardiovascular:  Negative for chest pain.   Neurological:  Negative for dizziness and headaches.   All other systems reviewed and are  negative.          Objective       ED Triage Vitals [07/24/25 2114]   Temperature Pulse Blood Pressure Respirations SpO2 Patient Position - Orthostatic VS   98 °F (36.7 °C) 60 (!) 209/120 16 96 % Sitting      Temp Source Heart Rate Source BP Location FiO2 (%) Pain Score    Tympanic Monitor Right arm -- 5      Vitals      Date and Time Temp Pulse SpO2 Resp BP Pain Score FACES Pain Rating User   07/24/25 2230 -- 56 96 % 16 159/93 -- -- RL   07/24/25 2215 -- 58 96 % 16 157/93 -- -- RL   07/24/25 2200 -- 60 96 % 16 166/94 -- -- RL   07/24/25 2155 -- -- -- -- 180/104 -- -- TG   07/24/25 2130 -- 57 96 % 16 165/97 -- -- RL   07/24/25 2121 -- -- -- -- -- 5 -- RL   07/24/25 2114 98 °F (36.7 °C) 60 96 % 16 209/120 5 -- RL            Physical Exam  Vitals and nursing note reviewed.   Constitutional:       Appearance: Normal appearance.   HENT:      Head: Normocephalic.      Right Ear: External ear normal.      Left Ear: External ear normal.      Nose: Nose normal.     Eyes:      Conjunctiva/sclera: Conjunctivae normal.       Cardiovascular:      Rate and Rhythm: Normal rate.   Pulmonary:      Effort: No respiratory distress.   Abdominal:      General: There is no distension.     Musculoskeletal:         General: Normal range of motion.      Cervical back: Normal range of motion.     Skin:     General: Skin is warm and dry.     Neurological:      Mental Status: He is alert. Mental status is at baseline.         Results Reviewed       None            No orders to display       Procedures    ED Medication and Procedure Management   Prior to Admission Medications   Prescriptions Last Dose Informant Patient Reported? Taking?   Acetaminophen (TYLENOL 8 HOUR PO)   Yes No   Sig: Take by mouth   aspirin 81 mg chewable tablet   No No   Sig: Chew 1 tablet (81 mg total) daily   atorvastatin (LIPITOR) 20 mg tablet   No No   Sig: Take 1 tablet (20 mg total) by mouth daily   chlorthalidone 25 mg tablet   Yes No   Sig: Take 25 mg by mouth in  the morning.   ramipril (ALTACE) 5 mg capsule   Yes No   Sig: Take 10 mg by mouth in the morning.      Facility-Administered Medications: None     Discharge Medication List as of 7/24/2025 11:36 PM        CONTINUE these medications which have NOT CHANGED    Details   Acetaminophen (TYLENOL 8 HOUR PO) Take by mouth, Historical Med      aspirin 81 mg chewable tablet Chew 1 tablet (81 mg total) daily, Starting Wed 7/23/2025, Print      atorvastatin (LIPITOR) 20 mg tablet Take 1 tablet (20 mg total) by mouth daily, Starting Wed 7/23/2025, Print      chlorthalidone 25 mg tablet Take 25 mg by mouth in the morning., Starting Fri 1/22/2021, Historical Med      ramipril (ALTACE) 5 mg capsule Take 10 mg by mouth in the morning., Starting Thu 2/4/2021, Historical Med           No discharge procedures on file.  ED SEPSIS DOCUMENTATION   Time reflects when diagnosis was documented in both MDM as applicable and the Disposition within this note       Time User Action Codes Description Comment    7/24/2025 11:35 PM Kiana Turcios Add [I10] Primary hypertension                    Kiana Turcios MD  07/25/25 0121         [1]   Past Medical History:  Diagnosis Date    Hypertension     borderline    TIA (transient ischemic attack)    [2] No past surgical history on file.  [3]   Family History  Problem Relation Name Age of Onset    Heart disease Mother     [4]   Social History  Tobacco Use    Smoking status: Never    Smokeless tobacco: Never   Vaping Use    Vaping status: Never Used   Substance Use Topics    Alcohol use: Yes    Drug use: Not Currently        Kiana Turcios MD  07/25/25 0121

## 2025-07-29 ENCOUNTER — HOSPITAL ENCOUNTER (EMERGENCY)
Age: 70
Discharge: HOME | End: 2025-07-29
Attending: EMERGENCY MEDICINE | Admitting: EMERGENCY MEDICINE
Payer: MEDICARE

## 2025-07-29 VITALS
WEIGHT: 195.11 LBS | BODY MASS INDEX: 26.43 KG/M2 | SYSTOLIC BLOOD PRESSURE: 130 MMHG | DIASTOLIC BLOOD PRESSURE: 80 MMHG | TEMPERATURE: 98.4 F | HEART RATE: 70 BPM | RESPIRATION RATE: 16 BRPM | OXYGEN SATURATION: 97 % | HEIGHT: 72 IN

## 2025-07-29 DIAGNOSIS — M79.10 MUSCLE PAIN: Primary | ICD-10-CM

## 2025-07-29 DIAGNOSIS — E86.0 DEHYDRATION, MILD: ICD-10-CM

## 2025-07-29 DIAGNOSIS — R74.8 ELEVATED CK: ICD-10-CM

## 2025-07-29 LAB
ALBUMIN SERPL BCG-MCNC: 4.8 G/DL (ref 3.5–5.2)
ALP SERPL-CCNC: 53 U/L (ref 35–160)
ALT SERPL W P-5'-P-CCNC: 48 U/L (ref 5–33)
ANION GAP SERPL CALCULATED.3IONS-SCNC: 12 MMOL/L (ref 7–16)
AST SERPL W P-5'-P-CCNC: 49 U/L (ref 5–40)
BASOPHILS # BLD AUTO: 0.02 THOUSANDS/ÂΜL (ref 0–0.1)
BASOPHILS NFR BLD AUTO: 0 % (ref 0–1)
BILIRUB SERPL-MCNC: 0.6 MG/DL (ref 0.2–1.2)
BILIRUB UR QL STRIP: NEGATIVE
BUN SERPL-MCNC: 31 MG/DL (ref 8–23)
CALCIUM SERPL-MCNC: 10.1 MG/DL (ref 8.6–10.2)
CHLORIDE SERPL-SCNC: 99 MMOL/L (ref 98–107)
CK SERPL-CCNC: 899 U/L (ref 20–200)
CK SERPL-CCNC: 979 U/L (ref 20–200)
CLARITY UR: CLEAR
CO2 SERPL-SCNC: 24 MMOL/L (ref 22–29)
COLOR UR: YELLOW
CREAT SERPL-MCNC: 1.37 MG/DL (ref 0.7–1.2)
EOSINOPHIL # BLD AUTO: 0.03 THOUSAND/ÂΜL (ref 0–0.61)
EOSINOPHIL NFR BLD AUTO: 0 % (ref 0–6)
ERYTHROCYTE [DISTWIDTH] IN BLOOD BY AUTOMATED COUNT: 13 % (ref 11.6–15.1)
GFR SERPL CREATININE-BSD FRML MDRD: 52 ML/MIN/1.73SQ M
GLUCOSE SERPL-MCNC: 105 MG/DL (ref 65–140)
GLUCOSE UR STRIP-MCNC: NEGATIVE MG/DL
HCT VFR BLD AUTO: 38.5 % (ref 36.5–49.3)
HGB BLD-MCNC: 13.4 G/DL (ref 12–17)
HGB UR QL STRIP.AUTO: NEGATIVE
IMM GRANULOCYTES # BLD AUTO: 0.03 THOUSAND/UL (ref 0–0.2)
IMM GRANULOCYTES NFR BLD AUTO: 0 % (ref 0–2)
KETONES UR STRIP-MCNC: NEGATIVE MG/DL
LEUKOCYTE ESTERASE UR QL STRIP: NEGATIVE
LYMPHOCYTES # BLD AUTO: 1.39 THOUSANDS/ÂΜL (ref 0.6–4.47)
LYMPHOCYTES NFR BLD AUTO: 18 % (ref 14–44)
MAGNESIUM SERPL-MCNC: 2.6 MG/DL (ref 1.6–2.6)
MCH RBC QN AUTO: 31 PG (ref 26.8–34.3)
MCHC RBC AUTO-ENTMCNC: 34.8 G/DL (ref 31.4–37.4)
MCV RBC AUTO: 89 FL (ref 82–98)
MONOCYTES # BLD AUTO: 0.66 THOUSAND/ÂΜL (ref 0.17–1.22)
MONOCYTES NFR BLD AUTO: 9 % (ref 4–12)
NEUTROPHILS # BLD AUTO: 5.5 THOUSANDS/ÂΜL (ref 1.85–7.62)
NEUTS SEG NFR BLD AUTO: 73 % (ref 43–75)
NITRITE UR QL STRIP: NEGATIVE
NRBC BLD AUTO-RTO: 0 /100 WBCS
PH UR STRIP.AUTO: 6.5 [PH]
PHOSPHATE SERPL-MCNC: 3.3 MG/DL (ref 2.5–4.5)
PLATELET # BLD AUTO: 199 THOUSANDS/UL (ref 149–390)
PMV BLD AUTO: 10.9 FL (ref 8.9–12.7)
POTASSIUM SERPL-SCNC: 4.9 MMOL/L (ref 3.5–5.1)
PROT SERPL-MCNC: 7.6 G/DL (ref 6.4–8.3)
PROT UR STRIP-MCNC: NEGATIVE MG/DL
RBC # BLD AUTO: 4.32 MILLION/UL (ref 3.88–5.62)
SODIUM SERPL-SCNC: 135 MMOL/L (ref 136–145)
SP GR UR STRIP.AUTO: 1.01 (ref 1–1.03)
TROPONIN T SERPL HS-MCNC: 20 NG/L (ref 6–22)
UROBILINOGEN UR QL STRIP.AUTO: 0.2 E.U./DL
WBC # BLD AUTO: 7.63 THOUSAND/UL (ref 4.31–10.16)

## 2025-07-29 PROCEDURE — 99283 EMERGENCY DEPT VISIT LOW MDM: CPT

## 2025-07-29 PROCEDURE — 84100 ASSAY OF PHOSPHORUS: CPT | Performed by: PHYSICIAN ASSISTANT

## 2025-07-29 PROCEDURE — 96360 HYDRATION IV INFUSION INIT: CPT

## 2025-07-29 PROCEDURE — 93005 ELECTROCARDIOGRAM TRACING: CPT

## 2025-07-29 PROCEDURE — 83735 ASSAY OF MAGNESIUM: CPT | Performed by: PHYSICIAN ASSISTANT

## 2025-07-29 PROCEDURE — 85025 COMPLETE CBC W/AUTO DIFF WBC: CPT | Performed by: PHYSICIAN ASSISTANT

## 2025-07-29 PROCEDURE — 81003 URINALYSIS AUTO W/O SCOPE: CPT | Performed by: PHYSICIAN ASSISTANT

## 2025-07-29 PROCEDURE — 82550 ASSAY OF CK (CPK): CPT | Performed by: PHYSICIAN ASSISTANT

## 2025-07-29 PROCEDURE — 84484 ASSAY OF TROPONIN QUANT: CPT | Performed by: PHYSICIAN ASSISTANT

## 2025-07-29 PROCEDURE — 80053 COMPREHEN METABOLIC PANEL: CPT | Performed by: PHYSICIAN ASSISTANT

## 2025-07-29 RX ADMIN — SODIUM CHLORIDE 1000 ML: 900 INJECTION, SOLUTION INTRAVENOUS at 18:58

## 2025-07-30 LAB
ATRIAL RATE: 62 BPM
P AXIS: 68 DEGREES
PR INTERVAL: 196 MS
QRS AXIS: -19 DEGREES
QRSD INTERVAL: 102 MS
QT INTERVAL: 434 MS
QTC INTERVAL: 440 MS
T WAVE AXIS: 48 DEGREES
VENTRICULAR RATE: 62 BPM

## 2025-07-30 PROCEDURE — 93010 ELECTROCARDIOGRAM REPORT: CPT | Performed by: INTERNAL MEDICINE

## 2025-08-06 ENCOUNTER — HOSPITAL ENCOUNTER (EMERGENCY)
Age: 70
Discharge: HOME | End: 2025-08-07
Attending: EMERGENCY MEDICINE
Payer: MEDICARE

## 2025-08-08 ENCOUNTER — TELEPHONE (OUTPATIENT)
Age: 70
End: 2025-08-08

## 2025-08-11 ENCOUNTER — APPOINTMENT (EMERGENCY)
Age: 70
End: 2025-08-11
Payer: MEDICARE

## 2025-08-11 ENCOUNTER — HOSPITAL ENCOUNTER (EMERGENCY)
Age: 70
Discharge: HOME | End: 2025-08-11
Attending: EMERGENCY MEDICINE | Admitting: EMERGENCY MEDICINE
Payer: MEDICARE

## 2025-08-11 PROBLEM — R20.2 PARESTHESIAS: Status: ACTIVE | Noted: 2025-08-11
